# Patient Record
Sex: MALE | Race: WHITE | NOT HISPANIC OR LATINO | Employment: OTHER | URBAN - METROPOLITAN AREA
[De-identification: names, ages, dates, MRNs, and addresses within clinical notes are randomized per-mention and may not be internally consistent; named-entity substitution may affect disease eponyms.]

---

## 2017-07-20 PROBLEM — C09.9: Status: ACTIVE | Noted: 2017-07-20

## 2017-08-23 PROBLEM — Z95.828 PORTACATH IN PLACE: Status: ACTIVE | Noted: 2017-08-23

## 2018-01-11 PROBLEM — Z12.11 SCREENING FOR COLON CANCER: Status: ACTIVE | Noted: 2018-01-11

## 2018-01-11 PROBLEM — B18.2 CHRONIC HEPATITIS C WITHOUT HEPATIC COMA: Status: ACTIVE | Noted: 2018-01-11

## 2018-03-20 PROBLEM — K74.60 HEPATIC CIRRHOSIS DUE TO CHRONIC HEPATITIS C INFECTION: Status: ACTIVE | Noted: 2018-01-11

## 2018-10-15 PROBLEM — R10.11 RIGHT UPPER QUADRANT ABDOMINAL PAIN: Status: ACTIVE | Noted: 2018-10-15

## 2018-10-15 PROBLEM — D75.1 POLYCYTHEMIA: Status: ACTIVE | Noted: 2018-10-15

## 2018-10-18 PROBLEM — R74.8 LOW SERUM ALKALINE PHOSPHATASE: Status: ACTIVE | Noted: 2018-10-18

## 2019-09-25 PROBLEM — M27.2: Status: ACTIVE | Noted: 2019-09-25

## 2020-11-18 ENCOUNTER — OFFICE VISIT (OUTPATIENT)
Dept: ORTHOPEDICS | Facility: CLINIC | Age: 60
End: 2020-11-18
Payer: MEDICAID

## 2020-11-18 ENCOUNTER — HOSPITAL ENCOUNTER (OUTPATIENT)
Dept: RADIOLOGY | Facility: HOSPITAL | Age: 60
Discharge: HOME | End: 2020-11-18
Attending: PHYSICIAN ASSISTANT
Payer: MEDICAID

## 2020-11-18 ENCOUNTER — OFFICE VISIT (OUTPATIENT)
Dept: NEUROLOGY | Facility: CLINIC | Age: 60
End: 2020-11-18
Payer: MEDICAID

## 2020-11-18 VITALS
DIASTOLIC BLOOD PRESSURE: 86 MMHG | WEIGHT: 159.38 LBS | SYSTOLIC BLOOD PRESSURE: 136 MMHG | RESPIRATION RATE: 16 BRPM | HEIGHT: 67 IN | TEMPERATURE: 98 F | HEART RATE: 82 BPM | BODY MASS INDEX: 25.01 KG/M2

## 2020-11-18 VITALS
HEIGHT: 67 IN | DIASTOLIC BLOOD PRESSURE: 86 MMHG | TEMPERATURE: 97 F | WEIGHT: 157.06 LBS | HEART RATE: 82 BPM | BODY MASS INDEX: 24.65 KG/M2 | SYSTOLIC BLOOD PRESSURE: 136 MMHG | RESPIRATION RATE: 18 BRPM

## 2020-11-18 DIAGNOSIS — G89.29 CHRONIC MIDLINE LOW BACK PAIN WITH SCIATICA, SCIATICA LATERALITY UNSPECIFIED: ICD-10-CM

## 2020-11-18 DIAGNOSIS — M25.511 CHRONIC RIGHT SHOULDER PAIN: ICD-10-CM

## 2020-11-18 DIAGNOSIS — G89.29 CHRONIC NECK PAIN: Primary | ICD-10-CM

## 2020-11-18 DIAGNOSIS — M54.9 DORSALGIA, UNSPECIFIED: ICD-10-CM

## 2020-11-18 DIAGNOSIS — M54.40 CHRONIC MIDLINE LOW BACK PAIN WITH SCIATICA, SCIATICA LATERALITY UNSPECIFIED: ICD-10-CM

## 2020-11-18 DIAGNOSIS — G89.29 CHRONIC RIGHT SHOULDER PAIN: Primary | ICD-10-CM

## 2020-11-18 DIAGNOSIS — M54.2 CHRONIC NECK PAIN: Primary | ICD-10-CM

## 2020-11-18 DIAGNOSIS — M25.511 CHRONIC RIGHT SHOULDER PAIN: Primary | ICD-10-CM

## 2020-11-18 DIAGNOSIS — G89.29 CHRONIC RIGHT SHOULDER PAIN: ICD-10-CM

## 2020-11-18 DIAGNOSIS — G89.29 CHRONIC NECK PAIN: ICD-10-CM

## 2020-11-18 DIAGNOSIS — M54.2 CHRONIC NECK PAIN: ICD-10-CM

## 2020-11-18 PROCEDURE — 72100 X-RAY EXAM L-S SPINE 2/3 VWS: CPT | Mod: 26,,, | Performed by: RADIOLOGY

## 2020-11-18 PROCEDURE — 99203 PR OFFICE/OUTPT VISIT, NEW, LEVL III, 30-44 MIN: ICD-10-PCS | Mod: S$PBB,25,, | Performed by: PHYSICIAN ASSISTANT

## 2020-11-18 PROCEDURE — 97110 THERAPEUTIC EXERCISES: CPT | Mod: S$PBB,GP,, | Performed by: PHYSICIAN ASSISTANT

## 2020-11-18 PROCEDURE — 99999 PR PBB SHADOW E&M-EST. PATIENT-LVL V: CPT | Mod: PBBFAC,,, | Performed by: PHYSICIAN ASSISTANT

## 2020-11-18 PROCEDURE — 72100 X-RAY EXAM L-S SPINE 2/3 VWS: CPT | Mod: TC

## 2020-11-18 PROCEDURE — 73030 X-RAY EXAM OF SHOULDER: CPT | Mod: 26,RT,, | Performed by: RADIOLOGY

## 2020-11-18 PROCEDURE — 72100 XR LUMBAR SPINE 2 OR 3 VIEWS: ICD-10-PCS | Mod: 26,,, | Performed by: RADIOLOGY

## 2020-11-18 PROCEDURE — 73030 X-RAY EXAM OF SHOULDER: CPT | Mod: TC,RT

## 2020-11-18 PROCEDURE — 73030 XR SHOULDER COMPLETE 2 OR MORE VIEWS RIGHT: ICD-10-PCS | Mod: 26,RT,, | Performed by: RADIOLOGY

## 2020-11-18 PROCEDURE — 20610 DRAIN/INJ JOINT/BURSA W/O US: CPT | Mod: S$PBB,RT,, | Performed by: PHYSICIAN ASSISTANT

## 2020-11-18 PROCEDURE — 20610 DRAIN/INJ JOINT/BURSA W/O US: CPT | Mod: PBBFAC | Performed by: PHYSICIAN ASSISTANT

## 2020-11-18 PROCEDURE — 99215 OFFICE O/P EST HI 40 MIN: CPT | Mod: PBBFAC | Performed by: PHYSICIAN ASSISTANT

## 2020-11-18 PROCEDURE — 99215 OFFICE O/P EST HI 40 MIN: CPT | Mod: PBBFAC,25,27 | Performed by: PHYSICIAN ASSISTANT

## 2020-11-18 PROCEDURE — 99999 PR PBB SHADOW E&M-EST. PATIENT-LVL V: ICD-10-PCS | Mod: PBBFAC,,, | Performed by: PHYSICIAN ASSISTANT

## 2020-11-18 PROCEDURE — 99203 OFFICE O/P NEW LOW 30 MIN: CPT | Mod: S$PBB,25,, | Performed by: PHYSICIAN ASSISTANT

## 2020-11-18 PROCEDURE — 72040 X-RAY EXAM NECK SPINE 2-3 VW: CPT | Mod: 26,,, | Performed by: RADIOLOGY

## 2020-11-18 PROCEDURE — 72040 X-RAY EXAM NECK SPINE 2-3 VW: CPT | Mod: TC

## 2020-11-18 PROCEDURE — 72040 XR CERVICAL SPINE 2 OR 3 VIEWS: ICD-10-PCS | Mod: 26,,, | Performed by: RADIOLOGY

## 2020-11-18 PROCEDURE — 97110 PR THERAPEUTIC EXERCISES: ICD-10-PCS | Mod: S$PBB,GP,, | Performed by: PHYSICIAN ASSISTANT

## 2020-11-18 PROCEDURE — 20610 PR DRAIN/INJECT LARGE JOINT/BURSA: ICD-10-PCS | Mod: S$PBB,RT,, | Performed by: PHYSICIAN ASSISTANT

## 2020-11-18 PROCEDURE — 99204 PR OFFICE/OUTPT VISIT, NEW, LEVL IV, 45-59 MIN: ICD-10-PCS | Mod: S$PBB,,, | Performed by: PHYSICIAN ASSISTANT

## 2020-11-18 PROCEDURE — 99204 OFFICE O/P NEW MOD 45 MIN: CPT | Mod: S$PBB,,, | Performed by: PHYSICIAN ASSISTANT

## 2020-11-18 RX ORDER — TRIAMCINOLONE ACETONIDE 40 MG/ML
40 INJECTION, SUSPENSION INTRA-ARTICULAR; INTRAMUSCULAR ONCE
Status: COMPLETED | OUTPATIENT
Start: 2020-11-18 | End: 2020-11-18

## 2020-11-18 RX ORDER — BUPIVACAINE HYDROCHLORIDE 2.5 MG/ML
3 INJECTION, SOLUTION INFILTRATION; PERINEURAL ONCE
Status: COMPLETED | OUTPATIENT
Start: 2020-11-18 | End: 2020-11-18

## 2020-11-18 RX ORDER — LIDOCAINE 50 MG/G
2 PATCH TOPICAL DAILY
Qty: 90 PATCH | Refills: 3 | Status: SHIPPED | OUTPATIENT
Start: 2020-11-18 | End: 2023-08-11

## 2020-11-18 RX ORDER — DICLOFENAC SODIUM 10 MG/G
2 GEL TOPICAL 4 TIMES DAILY
Qty: 1 TUBE | Refills: 1 | Status: SHIPPED | OUTPATIENT
Start: 2020-11-18 | End: 2020-12-31 | Stop reason: SDUPTHER

## 2020-11-18 RX ADMIN — TRIAMCINOLONE ACETONIDE 40 MG: 40 INJECTION, SUSPENSION INTRA-ARTICULAR; INTRAMUSCULAR at 11:11

## 2020-11-18 RX ADMIN — BUPIVACAINE HYDROCHLORIDE 7.5 MG: 2.5 INJECTION, SOLUTION INFILTRATION; PERINEURAL at 11:11

## 2020-11-18 NOTE — PROGRESS NOTES
Subjective:      Patient ID: Junior Torito Serna Jr. is a 59 y.o. male.    Chief Complaint: Pain of the Right Shoulder    Review of patient's allergies indicates:  No Known Allergies   58 yo M presents to clinic with c/o right shoulder pain x months.  Denies any injury or trauma.  Pain is sharp and located to posterior shoulder.  Worse with reaching overhead or behind back.  He has also noticed decreased ROM and weakness.  Taking tylenol occasionally with some improvement of pain.  No swelling.  No numbness or tingling in shoulder/arm.      Review of Systems   Constitution: Negative for chills, diaphoresis and fever.   HENT: Negative for congestion, ear discharge and ear pain.    Eyes: Negative for blurred vision, discharge, double vision and pain.   Cardiovascular: Negative for chest pain, claudication and cyanosis.   Respiratory: Negative for cough, hemoptysis and shortness of breath.    Endocrine: Negative for cold intolerance and heat intolerance.   Skin: Negative for color change, dry skin, itching and rash.   Musculoskeletal: Positive for joint pain. Negative for arthritis, back pain, falls, gout, joint swelling, muscle weakness and neck pain.   Gastrointestinal: Negative for abdominal pain and change in bowel habit.   Neurological: Negative for brief paralysis, disturbances in coordination and dizziness.   Psychiatric/Behavioral: Negative for altered mental status and depression.         Objective:          General    Constitutional: He is oriented to person, place, and time. He appears well-developed and well-nourished. No distress.   HENT:   Head: Atraumatic.   Eyes: EOM are normal. Right eye exhibits no discharge. Left eye exhibits no discharge.   Cardiovascular: Normal rate.    Pulmonary/Chest: Effort normal. No respiratory distress.   Abdominal: Soft.   Neurological: He is alert and oriented to person, place, and time.   Psychiatric: He has a normal mood and affect. His behavior is normal.         Right  Shoulder Exam     Inspection/Observation   Swelling: absent  Bruising: absent  Scars: absent  Deformity: absent    Range of Motion   Active abduction: 110   Passive abduction: 120   Forward Flexion: 90   External Rotation 0 degrees: 40   Internal rotation 0 degrees: Sacrum     Tests & Signs   Huntley test: positive  Impingement: positive  Lift Off Sign: positive  Belly Press: positive  Speed's Test: positive    Other   Sensation: normal    Comments:  Tenderness to posterior shoulder    Left Shoulder Exam     Inspection/Observation   Swelling: absent  Bruising: absent  Scars: absent  Deformity: absent    Range of Motion   Active abduction: 160   Passive abduction: 160   Forward Flexion: 160   External Rotation 0 degrees: 90   Internal rotation 0 degrees: Mid thoracic     Tests & Signs   Huntley test: negative  Impingement: negative  Lift Off Sign: negative  Belly Press: negative  Speed's Test: negative    Other   Sensation: normal       Muscle Strength   Right Upper Extremity   Shoulder Abduction: 4/5   Shoulder Internal Rotation: 4/5   Shoulder External Rotation: 4/5   Supraspinatus: 4/5   Subscapularis: 4/5   Biceps: 5/5   Left Upper Extremity  Shoulder Abduction: 5/5   Shoulder Internal Rotation: 5/5   Shoulder External Rotation: 5/5   Supraspinatus: 5/5   Subscapularis: 5/5   Biceps: 5/5     Vascular Exam     Right Pulses      Radial:                    2+      Left Pulses      Radial:                    2+      Capillary Refill  Right Hand: normal capillary refill  Left Hand: normal capillary refill              Assessment:         Xray Right Shoulder 11/18/20:  There is no evidence of fracture, dislocation or other acute osseous abnormality. No focal soft tissue abnormality.      Encounter Diagnosis   Name Primary?    Chronic right shoulder pain Yes    Chronic right shoulder pain  -     Ambulatory referral/consult to Orthopedics  -     triamcinolone acetonide injection 40 mg  -     bupivacaine 0.25% (2.5  mg/ml) injection 7.5 mg  -     diclofenac sodium (VOLTAREN) 1 % Gel; Apply 2 g topically 4 (four) times daily. As needed  Dispense: 1 Tube; Refill: 1               Plan:         We have discussed a variety of treatment options including medications, injections, physical therapy and other alternative treatments. I also explained the indications, risks and benefits of surgery. Patient chooses to proceed with CSI and physical therapy at this time.    I made the decision to obtain old records of the patient including previous notes and imaging. New imaging was ordered today of the extremity or extremities evaluated. I independently reviewed and interpreted the radiographs and/or MRIs today as well as prior imaging.      1. Injection Procedure  A time out was performed, including verification of patient ID, procedure, site and side, availability of information and equipment, review of safety issues, and agreement with consent, the procedure site was marked.    After time out was performed, the patient was prepped aseptically with chloraprep swabstick. A diagnostic and therapeutic injection of 1:3cc Kenalog/Marcaine was given under sterile technique using a 22g x 1.5 needle from the Posterior  aspect of the right Subacromial in the sitting position.      Bowen Torito Serna Jr. had no adverse reactions to the medication. Pain decreased. He was instructed to apply ice to the joint for 20 minutes and avoid strenuous activities for 24-36 hours following the injection. He was warned of possible blood sugar and/or blood pressure changes during that time. Following that time, he can resume regular activities.    He was reminded to call the clinic immediately for any adverse side effects as explained in clinic today.    2. Ice compress to the affected area 2-3x a day for 15-20 minutes as needed for pain management.  3. Voltaren gel topically as prescribed as needed. Patient is aware that he should not take any other NSAIDs  while using this.  4. Ambulatory referral to physical therapy for periscapular/rotator cuff strengthening.   5. HEP 10451 - I instructed and demonstrated a periscapular/rotator cuff strengthening HEP. The patient then demonstrated understanding of exercises and proper technique. This program was performed for 10 minutes.   6. RTC in 6 weeks for follow-up, sooner if needed. Consider MRI if not improved.    Patient voices understanding of and agreement with treatment plan. All of the patient's questions were answered and the patient will contact us if he has any questions or concerns in the interim.

## 2020-11-18 NOTE — PROGRESS NOTES
Subjective:       Patient ID: Junior Torito Serna Jr. is a 59 y.o. male.    Chief Complaint: Neurologic Problem (Numbness to the right arm and left leg, low back pain and neck pain)    HPI  Junior Torito Serna Jr. is a 59 y.o. male is here for initial visit. He is referred for chronic neck and low back pain with radiation into both arms and both legs. This pain has been present for at least 3 years. He underwent PT about 2 years ago as well as epidural steroid injections in neck and back. Neither PT nor injections were helpful. For the past 2 years, he has been taking only Tylenol 800mg daily for pain. He has just been treated for cirrhosis, and he is cautious with medications.     New symptoms of numbness (pins and needles) in the left leg for the past 3 weeks. He is having difficulty walking. He feels a soreness constantly in the left leg. This is worrisome for him. He has not fallen. He has no weakness. Pain is more in low back than legs. LBP is constant. No increased pain with prolonged walking and standing. Bending is what aggravates his back the most.    He has constant pain in the right shoulder. He has shooting pains in the neck with certain motions and especially in the mornings. Improves as he is up and around. He has no weakness in arms and hands currently. Pain in neck>>arms. He has ED since he has had neck pain.    Prior treatment in [place], [facility] in [place], [place]. He had MRIs and injections.    Past Medical History:   Diagnosis Date    Cirrhosis     Glaucoma     Hepatitis C     Hepatitis C     History of chemotherapy 2014    Tonsil Cancer    History of radiation to head and neck region 2014    Tonsil Cancer    Hypertension     Right tonsillar squamous cell carcinoma 2014       Past Surgical History:   Procedure Laterality Date    COLONOSCOPY N/A 4/6/2018    rpt 2023    GASTROSTOMY TUBE PLACEMENT      GASTROSTOMY TUBE REVISION      LAPAROSCOPIC  CHOLECYSTECTOMY N/A 10/16/2018    Procedure: CHOLECYSTECTOMY, LAPAROSCOPIC;  Surgeon: Terence Dash MD;  Location: [facility];  Service: General;  Laterality: N/A;    MEDIPORT REMOVAL N/A 09/01/2017    PORTACATH PLACEMENT      removal    UPPER GASTROINTESTINAL ENDOSCOPY         Family History   Problem Relation Age of Onset    Hypertension Mother     Dementia Mother     Parkinsonism Father        Social History     Socioeconomic History    Marital status:      Spouse name: Not on file    Number of children: Not on file    Years of education: Not on file    Highest education level: Not on file   Occupational History    Not on file   Social Needs    Financial resource strain: Not on file    Food insecurity     Worry: Not on file     Inability: Not on file    Transportation needs     Medical: Not on file     Non-medical: Not on file   Tobacco Use    Smoking status: Never Smoker    Smokeless tobacco: Former User   Substance and Sexual Activity    Alcohol use: No    Drug use: Yes     Types: Marijuana    Sexual activity: Yes     Partners: Female   Lifestyle    Physical activity     Days per week: Not on file     Minutes per session: Not on file    Stress: Not on file   Relationships    Social connections     Talks on phone: Not on file     Gets together: Not on file     Attends [employer] service: Not on file     Active member of club or organization: Not on file     Attends meetings of clubs or organizations: Not on file     Relationship status: Not on file   Other Topics Concern    Not on file   Social History Narrative    Not on file       Current Outpatient Medications   Medication Sig Dispense Refill    acetaminophen (TYLENOL EXTRA STRENGTH) 500 MG tablet Take 500 mg by mouth every 6 (six) hours as needed for Pain.      quinapril (ACCUPRIL) 10 MG tablet Take 1 tablet (10 mg total) by mouth every evening. 30 tablet 0    sildenafiL (VIAGRA) 100 MG tablet Take 1 tablet (100 mg total) by  mouth daily as needed for Erectile Dysfunction. 15 tablet 3    lidocaine (LIDODERM) 5 % Place 2 patches onto the skin once daily. One patch to neck and one to low back daily. 90 patch 3    penicillin v potassium (VEETID) 500 MG tablet TAKE 1 TABLET BY MOUTH EVERY EVERY 6 HOURS UNTIL FINISHED  1    pentoxifylline (TRENTAL) 400 mg TbSR TAKE 1 TABLET BY MOUTH 3 X DAILY WITH FOOD UNTIL COMPLETLY HEALED  11     No current facility-administered medications for this visit.        Review of patient's allergies indicates:  No Known Allergies      Review of Systems   Constitutional: Negative for appetite change and fever.   HENT: Negative for sore throat.    Eyes: Negative for visual disturbance.   Respiratory: Negative for cough and shortness of breath.    Cardiovascular: Negative for chest pain.   Gastrointestinal: Negative for nausea and vomiting.   Endocrine: Negative for cold intolerance and heat intolerance.   Genitourinary: Negative for difficulty urinating.        ED     Musculoskeletal: Positive for arthralgias (right shoulder), back pain (radiating into left greater than right legs), neck pain and neck stiffness.   Skin: Negative for rash.   Allergic/Immunologic: Negative for food allergies.   Neurological: Positive for numbness (right leg). Negative for dizziness, tremors, seizures, speech difficulty, weakness and headaches.   Hematological: Does not bruise/bleed easily.   Psychiatric/Behavioral: Negative for agitation, decreased concentration and sleep disturbance.       Objective:      Neurologic Exam     Mental Status   Oriented to person, place, and time.     Cranial Nerves     CN III, IV, VI   Pupils are equal, round, and reactive to light.    Gait, Coordination, and Reflexes     Gait  Gait: normal    Reflexes   Right brachioradialis: 2+  Left brachioradialis: 2+  Right biceps: 2+  Left biceps: 2+  Right triceps: 2+  Left triceps: 2+  Right patellar: 2+  Left patellar: 2+  Right achilles: 2+  Left achilles:  2+    Physical Exam  Constitutional:       General: He is not in acute distress.     Appearance: Normal appearance. He is well-developed. He is not diaphoretic.   HENT:      Head: Normocephalic and atraumatic.      Right Ear: External ear normal.      Left Ear: External ear normal.      Nose: Nose normal.   Eyes:      General: No scleral icterus.        Right eye: No discharge.         Left eye: No discharge.      Extraocular Movements: Extraocular movements intact.      Conjunctiva/sclera: Conjunctivae normal.      Pupils: Pupils are equal, round, and reactive to light.   Neck:      Musculoskeletal: Normal range of motion and neck supple.   Cardiovascular:      Rate and Rhythm: Normal rate and regular rhythm.      Heart sounds: Normal heart sounds.   Pulmonary:      Effort: Pulmonary effort is normal.      Breath sounds: Normal breath sounds.   Abdominal:      General: Bowel sounds are normal.      Palpations: Abdomen is soft.   Musculoskeletal:      Right shoulder: He exhibits decreased range of motion and tenderness. He exhibits no swelling and no crepitus.      Cervical back: He exhibits decreased range of motion, tenderness, bony tenderness and pain. He exhibits no spasm.      Lumbar back: He exhibits decreased range of motion, tenderness, bony tenderness and pain. He exhibits no deformity and no spasm.   Skin:     General: Skin is warm and dry.   Neurological:      Mental Status: He is alert and oriented to person, place, and time.      Cranial Nerves: No cranial nerve deficit.      Sensory: Sensory deficit (decreases sensation to sharp left anterolateral thigh) present.      Motor: Motor function is intact. No abnormal muscle tone.      Coordination: Coordination is intact. Coordination normal.      Gait: Gait is intact.      Deep Tendon Reflexes: Reflexes are normal and symmetric.      Reflex Scores:       Tricep reflexes are 2+ on the right side and 2+ on the left side.       Bicep reflexes are 2+ on the  right side and 2+ on the left side.       Brachioradialis reflexes are 2+ on the right side and 2+ on the left side.       Patellar reflexes are 2+ on the right side and 2+ on the left side.       Achilles reflexes are 2+ on the right side and 2+ on the left side.     Comments: 5/5 biceps  5/5 triceps  5/5 deltoid/abd  No interosseous atrophy    5/5 hamstrings  5/5 quads  5/5 hip flexors  5/5 ankle dorsi and EHLs   Psychiatric:         Mood and Affect: Mood normal.         Behavior: Behavior normal.         Thought Content: Thought content normal.         Assessment:       1. Chronic neck pain    2. Chronic midline low back pain with sciatica, sciatica laterality unspecified    3. Chronic right shoulder pain    4. Dorsalgia, unspecified        Plan:   Chronic neck pain  -     Ambulatory referral/consult to Physical/Occupational Therapy; Future; Expected date: 11/25/2020  -     lidocaine (LIDODERM) 5 %; Place 2 patches onto the skin once daily. One patch to neck and one to low back daily.  Dispense: 90 patch; Refill: 3  -     X-Ray Cervical Spine 2 or 3 Views; Future; Expected date: 11/18/2020    Chronic midline low back pain with sciatica, sciatica laterality unspecified  -     Ambulatory referral/consult to Physical/Occupational Therapy; Future; Expected date: 11/25/2020  -     lidocaine (LIDODERM) 5 %; Place 2 patches onto the skin once daily. One patch to neck and one to low back daily.  Dispense: 90 patch; Refill: 3  -     X-Ray Lumbar Spine 2 Or 3 Views; Future; Expected date: 11/18/2020    Chronic right shoulder pain  -     Ambulatory referral/consult to Orthopedics; Future; Expected date: 11/25/2020  -     X-ray Shoulder 2 or More Views Right; Future; Expected date: 11/18/2020    Dorsalgia, unspecified  -     X-Ray Lumbar Spine 2 Or 3 Views; Future; Expected date: 11/18/2020    Instructed patient to start his own exercises and stretches daily. Demonstrated some. Advised him to find a program on you tube.  There are plenty of good neck and back exercise videos there. Avoid anything too painful.    Send for MRI reports from [facility] in [place] for review.    Discussed risks and benefits of potential treatment options at length as well as potential side effects of medication changes. Medications were changed. Please refer to medication reconciliation report for details. Medication compliance discussed. Instructed to call clinic if concerned or to ED if emergency.    EDUAR Warner

## 2020-11-18 NOTE — LETTER
November 18, 2020      Lisa Wilson, TROY-C  [address] [address]           [place] [facility]  [address] [address]  Phone: [phone]  Fax: [phone]          Patient: Junior Torito Serna Jr.   MR Number: [id number]   YOB: 1960   Date of Visit: 11/18/2020       Dear Lisa Wilson:    Thank you for referring Junior Serna to me for evaluation. Attached you will find relevant portions of my assessment and plan of care.    If you have questions, please do not hesitate to call me. I look forward to following Junior Serna along with you.    Sincerely,    EDUAR Robbins    Enclosure  CC:  No Recipients    If you would like to receive this communication electronically, please contact [email or web address] or [phone] to request more information on [employer] access.    For providers and/or their staff who would like to refer a patient to [facility], please contact us through our one-stop-shop provider referral line, [facility] access hospitalierge, at [phone].    If you feel you have received this communication in error or would no longer like to receive these types of communications, please e-mail [email or web address]

## 2020-11-18 NOTE — LETTER
Dr. Storey at bedside reviewing procedure with pt and pt's wife. Discharge instructions given and explained to pt and pt's wife. Both verbalize understanding of instructions.   November 18, 2020      EDUAR Robbins  [address] [facility] [facility] [address] [facility] [address]           [place] [facility]  [address] [facility] [facility] [address]  Phone: [phone]          Patient: Junior Torito Serna Jr.   MR Number: [id number]   YOB: 1960   Date of Visit: 11/18/2020       Dear Kita Mcpherson:    Thank you for referring Junior Serna to me for evaluation. Attached you will find relevant portions of my assessment and plan of care.    If you have questions, please do not hesitate to call me. I look forward to following Junior Serna along with you.    Sincerely,    Pretty Hendrix PA-C    Enclosure  CC:  No Recipients    If you would like to receive this communication electronically, please contact [email or web address] or [phone] to request more information on [employer] access.    For providers and/or their staff who would like to refer a patient to [facility], please contact us through our one-stop-shop provider referral line, [facility] , at [phone].    If you feel you have received this communication in error or would no longer like to receive these types of communications, please e-mail [email or web address]

## 2020-12-24 NOTE — PROGRESS NOTES
Subjective:      Patient ID: Junior Torito Serna Jr. is a 60 y.o. male.    Chief Complaint: Shoulder Pain (6 week follow up for right shoulder pain)    Review of patient's allergies indicates:  No Known Allergies   Interval:  Patient returns to clinic for follow up of right shoulder pain.  States that CSI at last visit provided good relief for about a week before symptoms returned.  He has been completing periscapular/rotator cuff strengthening home exercise program.  States that he has noticed slight improvement in ROM but is still having pain and difficulties with daily activities.  Voltaren gel also provides some relief.    HPI 11/18/20:  60 yo RHD M presents to clinic with c/o right shoulder pain x months.  Denies any injury or trauma.  Pain is sharp and located to posterior shoulder.  Worse with reaching overhead or behind back.  He has also noticed decreased ROM and weakness.  Taking tylenol occasionally with some improvement of pain.  No swelling.  No numbness or tingling in shoulder/arm.      Review of Systems   Constitution: Negative for chills, diaphoresis and fever.   HENT: Negative for congestion, ear discharge and ear pain.    Eyes: Negative for blurred vision, discharge, double vision and pain.   Cardiovascular: Negative for chest pain, claudication and cyanosis.   Respiratory: Negative for cough, hemoptysis and shortness of breath.    Endocrine: Negative for cold intolerance and heat intolerance.   Skin: Negative for color change, dry skin, itching and rash.   Musculoskeletal: Positive for joint pain. Negative for arthritis, back pain, falls, gout, joint swelling, muscle weakness and neck pain.   Gastrointestinal: Negative for abdominal pain and change in bowel habit.   Neurological: Negative for brief paralysis, disturbances in coordination and dizziness.   Psychiatric/Behavioral: Negative for altered mental status and depression.         Objective:          General    Constitutional: He is oriented  to person, place, and time. He appears well-developed and well-nourished. No distress.   HENT:   Head: Atraumatic.   Eyes: EOM are normal. Right eye exhibits no discharge. Left eye exhibits no discharge.   Cardiovascular: Normal rate.    Pulmonary/Chest: Effort normal. No respiratory distress.   Abdominal: Soft.   Neurological: He is alert and oriented to person, place, and time.   Psychiatric: He has a normal mood and affect. His behavior is normal.         Right Shoulder Exam     Inspection/Observation   Swelling: absent  Bruising: absent  Scars: absent  Deformity: absent    Range of Motion   Active abduction: 110   Passive abduction: 120   Forward Flexion: 90   External Rotation 0 degrees: 40   Internal rotation 0 degrees: Sacrum     Tests & Signs   Huntley test: positive  Impingement: positive  Lift Off Sign: positive  Belly Press: positive  Speed's Test: positive    Other   Sensation: normal    Comments:  Tenderness to posterior shoulder    Left Shoulder Exam     Inspection/Observation   Swelling: absent  Bruising: absent  Scars: absent  Deformity: absent    Range of Motion   Active abduction: 160   Passive abduction: 160   Forward Flexion: 160   External Rotation 0 degrees: 90   Internal rotation 0 degrees: Mid thoracic     Tests & Signs   Huntley test: negative  Impingement: negative  Lift Off Sign: negative  Belly Press: negative  Speed's Test: negative    Other   Sensation: normal       Muscle Strength   Right Upper Extremity   Shoulder Abduction: 4/5   Shoulder Internal Rotation: 4/5   Shoulder External Rotation: 4/5   Supraspinatus: 4/5   Subscapularis: 4/5   Biceps: 5/5   Left Upper Extremity  Shoulder Abduction: 5/5   Shoulder Internal Rotation: 5/5   Shoulder External Rotation: 5/5   Supraspinatus: 5/5   Subscapularis: 5/5   Biceps: 5/5     Vascular Exam     Right Pulses      Radial:                    2+      Left Pulses      Radial:                    2+      Capillary Refill  Right Hand: normal  capillary refill  Left Hand: normal capillary refill              Assessment:         Xray Right Shoulder 11/18/20:  There is no evidence of fracture, dislocation or other acute osseous abnormality. No focal soft tissue abnormality.      Encounter Diagnoses   Name Primary?    Shoulder pain, unspecified chronicity, unspecified laterality     Chronic right shoulder pain Yes    Chronic right shoulder pain  -     diclofenac sodium (VOLTAREN) 1 % Gel; Apply 2 g topically 4 (four) times daily. As needed  Dispense: 1 Tube; Refill: 1    Shoulder pain, unspecified chronicity, unspecified laterality  -     MRI Shoulder Without Contrast Right; Future; Expected date: 12/31/2020               Plan:         I made the decision to obtain old records of the patient including previous notes and imaging. New imaging was ordered today of the extremity or extremities evaluated. I independently reviewed and interpreted the radiographs and/or MRIs today as well as prior imaging.    1. MRI right shoulder to assess for rotator cuff pathology.   2. Discontinue activity/exercise until results of MRI.  3. Ice compress to the affected area 2-3x a day for 15-20 minutes as needed for pain management.  4. Continue voltaren gel topically as prescribed as needed. Refilled today.  5. RTC in 1 week for follow-up and MRI results, sooner if needed.    Patient voices understanding of and agreement with treatment plan. All of the patient's questions were answered and the patient will contact us if  he has any questions or concerns in the interim.

## 2020-12-30 ENCOUNTER — OFFICE VISIT (OUTPATIENT)
Dept: NEUROLOGY | Facility: CLINIC | Age: 60
End: 2020-12-30
Payer: MEDICAID

## 2020-12-30 VITALS
BODY MASS INDEX: 24.47 KG/M2 | OXYGEN SATURATION: 98 % | HEART RATE: 75 BPM | HEIGHT: 67 IN | WEIGHT: 155.88 LBS | RESPIRATION RATE: 14 BRPM | DIASTOLIC BLOOD PRESSURE: 70 MMHG | TEMPERATURE: 98 F | SYSTOLIC BLOOD PRESSURE: 114 MMHG

## 2020-12-30 DIAGNOSIS — M54.2 CHRONIC NECK PAIN: ICD-10-CM

## 2020-12-30 DIAGNOSIS — G89.29 CHRONIC BILATERAL LOW BACK PAIN WITH SCIATICA, SCIATICA LATERALITY UNSPECIFIED: ICD-10-CM

## 2020-12-30 DIAGNOSIS — G89.29 CHRONIC NECK PAIN: ICD-10-CM

## 2020-12-30 DIAGNOSIS — M25.511 CHRONIC RIGHT SHOULDER PAIN: ICD-10-CM

## 2020-12-30 DIAGNOSIS — M54.40 CHRONIC BILATERAL LOW BACK PAIN WITH SCIATICA, SCIATICA LATERALITY UNSPECIFIED: ICD-10-CM

## 2020-12-30 DIAGNOSIS — G89.29 CHRONIC RIGHT SHOULDER PAIN: ICD-10-CM

## 2020-12-30 PROBLEM — M25.519 CHRONIC SHOULDER PAIN: Status: ACTIVE | Noted: 2020-12-30

## 2020-12-30 PROCEDURE — 99999 PR PBB SHADOW E&M-EST. PATIENT-LVL IV: CPT | Mod: PBBFAC,,, | Performed by: PHYSICIAN ASSISTANT

## 2020-12-30 PROCEDURE — 99214 OFFICE O/P EST MOD 30 MIN: CPT | Mod: PBBFAC | Performed by: PHYSICIAN ASSISTANT

## 2020-12-30 PROCEDURE — 99999 PR PBB SHADOW E&M-EST. PATIENT-LVL IV: ICD-10-PCS | Mod: PBBFAC,,, | Performed by: PHYSICIAN ASSISTANT

## 2020-12-30 PROCEDURE — 99214 PR OFFICE/OUTPT VISIT, EST, LEVL IV, 30-39 MIN: ICD-10-PCS | Mod: S$PBB,,, | Performed by: PHYSICIAN ASSISTANT

## 2020-12-30 PROCEDURE — 99214 OFFICE O/P EST MOD 30 MIN: CPT | Mod: S$PBB,,, | Performed by: PHYSICIAN ASSISTANT

## 2020-12-30 NOTE — PROGRESS NOTES
Subjective:       Patient ID: Junior Torito Serna Jr. is a 60 y.o. male.    Chief Complaint: Follow-up    HPI  Junior Torito Serna Jr. is a 60 y.o. male is here for initial visit. He is referred for chronic neck and low back pain with radiation into both arms and both legs. This pain has been present for at least 3 years. He underwent PT about 2 years ago as well as epidural steroid injections in neck and back. Neither PT nor injections were helpful. For the past 2 years, he has been taking only Tylenol 800mg daily for pain. He has just been treated for cirrhosis, and he is cautious with medications.     New symptoms of numbness (pins and needles) in the left leg have improved with exercises. Gait is improved as well. He has not fallen. He has no weakness. Pain is more in low back than legs. LBP is improved with home exercise program. He is only reminded to do it when hurting, though. He has not done exercises daily. No increased pain with prolonged walking and standing. Bending is what aggravates his back the most.    He has constant pain in the right shoulder. He saw ortho and had steroid injection which helped. Lidoderm is very helpful, too.  He has shooting pains in the neck with certain motions and especially in the mornings. Improves as he is up and around. He has no weakness in arms and hands currently. Pain in neck>>arms.     Prior treatment in [place], [facility] in [place], [place]. He had MRIs and injections. Records reviewed. He has degenerative changes on C spine and Lspine MRs. No myelopathy or major stenosis. MRIs dated 2017. Medicaid did not approve repeat studies.    Past Medical History:   Diagnosis Date    Cirrhosis     Glaucoma     Hepatitis C     Hepatitis C     History of chemotherapy 2014    Tonsil Cancer    History of radiation to head and neck region 2014    Tonsil Cancer    Hypertension     Right tonsillar squamous cell carcinoma 2014       Past  Surgical History:   Procedure Laterality Date    COLONOSCOPY N/A 4/6/2018    rpt 2023    GASTROSTOMY TUBE PLACEMENT      GASTROSTOMY TUBE REVISION      LAPAROSCOPIC CHOLECYSTECTOMY N/A 10/16/2018    Procedure: CHOLECYSTECTOMY, LAPAROSCOPIC;  Surgeon: Terence Dash MD;  Location: [facility];  Service: General;  Laterality: N/A;    MEDIPORT REMOVAL N/A 09/01/2017    PORTACATH PLACEMENT      removal    UPPER GASTROINTESTINAL ENDOSCOPY         Family History   Problem Relation Age of Onset    Hypertension Mother     Dementia Mother     Parkinsonism Father        Social History     Socioeconomic History    Marital status:      Spouse name: Not on file    Number of children: Not on file    Years of education: Not on file    Highest education level: Not on file   Occupational History    Not on file   Social Needs    Financial resource strain: Not on file    Food insecurity     Worry: Not on file     Inability: Not on file    Transportation needs     Medical: Not on file     Non-medical: Not on file   Tobacco Use    Smoking status: Never Smoker    Smokeless tobacco: Former User   Substance and Sexual Activity    Alcohol use: No    Drug use: Yes     Types: Marijuana    Sexual activity: Yes     Partners: Female   Lifestyle    Physical activity     Days per week: Not on file     Minutes per session: Not on file    Stress: Not on file   Relationships    Social connections     Talks on phone: Not on file     Gets together: Not on file     Attends Protestant service: Not on file     Active member of club or organization: Not on file     Attends meetings of clubs or organizations: Not on file     Relationship status: Not on file   Other Topics Concern    Not on file   Social History Narrative    Not on file       Current Outpatient Medications   Medication Sig Dispense Refill    acetaminophen (TYLENOL EXTRA STRENGTH) 500 MG tablet Take 500 mg by mouth every 6 (six) hours as needed for Pain.       diclofenac sodium (VOLTAREN) 1 % Gel Apply 2 g topically 4 (four) times daily. As needed 1 Tube 1    lidocaine (LIDODERM) 5 % Place 2 patches onto the skin once daily. One patch to neck and one to low back daily. 90 patch 3    quinapril (ACCUPRIL) 10 MG tablet Take 1 tablet (10 mg total) by mouth every evening. 30 tablet 0    sildenafiL (VIAGRA) 100 MG tablet Take 1 tablet (100 mg total) by mouth daily as needed for Erectile Dysfunction. 15 tablet 0    penicillin v potassium (VEETID) 500 MG tablet TAKE 1 TABLET BY MOUTH EVERY EVERY 6 HOURS UNTIL FINISHED  1    pentoxifylline (TRENTAL) 400 mg TbSR TAKE 1 TABLET BY MOUTH 3 X DAILY WITH FOOD UNTIL COMPLETLY HEALED  11     No current facility-administered medications for this visit.        Review of patient's allergies indicates:  No Known Allergies      Review of Systems   Constitutional: Negative for appetite change and fever.   HENT: Negative for sore throat.    Eyes: Negative for visual disturbance.   Respiratory: Negative for cough and shortness of breath.    Cardiovascular: Negative for chest pain.   Gastrointestinal: Negative for nausea and vomiting.   Endocrine: Negative for cold intolerance and heat intolerance.   Genitourinary: Negative for difficulty urinating.        ED     Musculoskeletal: Positive for arthralgias (right shoulder), back pain (radiating into left greater than right legs), neck pain and neck stiffness.   Skin: Negative for rash.   Allergic/Immunologic: Negative for food allergies.   Neurological: Positive for numbness (right leg). Negative for dizziness, tremors, seizures, speech difficulty, weakness and headaches.   Hematological: Does not bruise/bleed easily.   Psychiatric/Behavioral: Negative for agitation, decreased concentration and sleep disturbance.       Objective:       [phone] 11/18/2020 Routine      Narrative & Impression     EXAMINATION:  XR LUMBAR SPINE 2 OR 3 VIEWS     CLINICAL HISTORY:  Back pain or radiculopathy, > 6  wks;chronic low back pain with radicular symptoms;.  Dorsalgia, unspecified     TECHNIQUE:  Three views of the lumbar spine were obtained dated November 18, 2020.     COMPARISON:  September 4, 2020.     FINDINGS:  Degenerative change of the lumbar spine with minimal disc space narrowing at L5-S1.  Questionable minimal anterolisthesis of L4 on L5.     There is no evidence of an acute fracture.     Impression:     As above.        Electronically signed by: Terence Valle MD  Date:                                            11/18/2020  Time:                                           10:21        Narrative & Impression     EXAMINATION:  XR CERVICAL SPINE 2 OR 3 VIEWS     CLINICAL HISTORY:  Cervicalgia     TECHNIQUE:  Three views of the cervical spine dated November 18, 2020.     COMPARISON:  September 4, 2020.     FINDINGS:  Multilevel cervical spondylosis with bony spurring and disc space narrowing greatest at C5 and C6.  Stable, minimal retrolisthesis of C3 on C4.     There is no plain film evidence of fracture or acute subluxation.  No prevertebral soft tissue swelling.     Impression:     As above.        Electronically signed by: Terence Valle MD  Date:                                            11/18/2020       Neurologic Exam     Mental Status   Oriented to person, place, and time.     Cranial Nerves     CN III, IV, VI   Pupils are equal, round, and reactive to light.    Gait, Coordination, and Reflexes     Gait  Gait: normal    Reflexes   Right brachioradialis: 2+  Left brachioradialis: 2+  Right biceps: 2+  Left biceps: 2+  Right triceps: 2+  Left triceps: 2+  Right patellar: 2+  Left patellar: 2+  Right achilles: 2+  Left achilles: 2+    Physical Exam  Constitutional:       General: He is not in acute distress.     Appearance: Normal appearance. He is well-developed. He is not diaphoretic.   HENT:      Head: Normocephalic and atraumatic.      Right Ear: External ear normal.      Left Ear: External ear normal.       Nose: Nose normal.   Eyes:      General: No scleral icterus.        Right eye: No discharge.         Left eye: No discharge.      Extraocular Movements: Extraocular movements intact.      Conjunctiva/sclera: Conjunctivae normal.      Pupils: Pupils are equal, round, and reactive to light.   Neck:      Musculoskeletal: Normal range of motion and neck supple.   Cardiovascular:      Rate and Rhythm: Normal rate and regular rhythm.      Heart sounds: Normal heart sounds.   Pulmonary:      Effort: Pulmonary effort is normal.      Breath sounds: Normal breath sounds.   Abdominal:      General: Bowel sounds are normal.      Palpations: Abdomen is soft.   Musculoskeletal:      Right shoulder: He exhibits decreased range of motion and tenderness. He exhibits no swelling and no crepitus.      Cervical back: He exhibits decreased range of motion, tenderness, bony tenderness and pain. He exhibits no spasm.      Lumbar back: He exhibits decreased range of motion, tenderness, bony tenderness and pain. He exhibits no deformity and no spasm.   Skin:     General: Skin is warm and dry.   Neurological:      Mental Status: He is alert and oriented to person, place, and time.      Cranial Nerves: No cranial nerve deficit.      Sensory: Sensory deficit (decreases sensation to sharp left anterolateral thigh) present.      Motor: Motor function is intact. No abnormal muscle tone.      Coordination: Coordination is intact. Coordination normal.      Gait: Gait is intact.      Deep Tendon Reflexes: Reflexes are normal and symmetric.      Reflex Scores:       Tricep reflexes are 2+ on the right side and 2+ on the left side.       Bicep reflexes are 2+ on the right side and 2+ on the left side.       Brachioradialis reflexes are 2+ on the right side and 2+ on the left side.       Patellar reflexes are 2+ on the right side and 2+ on the left side.       Achilles reflexes are 2+ on the right side and 2+ on the left side.     Comments: 5/5  biceps  5/5 triceps  5/5 deltoid/abd  No interosseous atrophy    5/5 hamstrings  5/5 quads  5/5 hip flexors  5/5 ankle dorsi and EHLs   Psychiatric:         Mood and Affect: Mood normal.         Behavior: Behavior normal.         Thought Content: Thought content normal.         Assessment:       1. Chronic neck pain    2. Chronic bilateral low back pain with sciatica, sciatica laterality unspecified    3. Chronic right shoulder pain        Plan:   Chronic neck pain  Continue regular stretches and exercises. Continue to work on improving posture.  Continue Lidoderm    Chronic bilateral low back pain with sciatica, sciatica laterality unspecified  Continue regular stretches and exercises. Continue to work on improving posture.  Continue Lidoderm    Chronic right shoulder pain  Follow up with ortho as scheduled.    Instructed patient to start his own exercises and stretches daily. Demonstrated some. Advised him to find a program on you tube. There are plenty of good neck and back exercise videos there. Avoid anything too painful.        Discussed risks and benefits of potential treatment options at length as well as potential side effects of medication changes. Medications were not changed. Please refer to medication reconciliation report for details. Medication compliance discussed. Instructed to call clinic if concerned or to ED if emergency.    EDUAR Warner

## 2020-12-31 ENCOUNTER — OFFICE VISIT (OUTPATIENT)
Dept: ORTHOPEDICS | Facility: CLINIC | Age: 60
End: 2020-12-31
Payer: MEDICAID

## 2020-12-31 VITALS
RESPIRATION RATE: 18 BRPM | TEMPERATURE: 98 F | HEIGHT: 68 IN | HEART RATE: 68 BPM | BODY MASS INDEX: 23.69 KG/M2 | WEIGHT: 156.31 LBS | DIASTOLIC BLOOD PRESSURE: 86 MMHG | SYSTOLIC BLOOD PRESSURE: 132 MMHG

## 2020-12-31 DIAGNOSIS — G89.29 CHRONIC RIGHT SHOULDER PAIN: Primary | ICD-10-CM

## 2020-12-31 DIAGNOSIS — M25.519 SHOULDER PAIN, UNSPECIFIED CHRONICITY, UNSPECIFIED LATERALITY: ICD-10-CM

## 2020-12-31 DIAGNOSIS — M25.511 CHRONIC RIGHT SHOULDER PAIN: Primary | ICD-10-CM

## 2020-12-31 PROCEDURE — 99213 PR OFFICE/OUTPT VISIT, EST, LEVL III, 20-29 MIN: ICD-10-PCS | Mod: S$PBB,,, | Performed by: PHYSICIAN ASSISTANT

## 2020-12-31 PROCEDURE — 99213 OFFICE O/P EST LOW 20 MIN: CPT | Mod: S$PBB,,, | Performed by: PHYSICIAN ASSISTANT

## 2020-12-31 PROCEDURE — 99999 PR PBB SHADOW E&M-EST. PATIENT-LVL IV: CPT | Mod: PBBFAC,,, | Performed by: PHYSICIAN ASSISTANT

## 2020-12-31 PROCEDURE — 99214 OFFICE O/P EST MOD 30 MIN: CPT | Mod: PBBFAC | Performed by: PHYSICIAN ASSISTANT

## 2020-12-31 PROCEDURE — 99999 PR PBB SHADOW E&M-EST. PATIENT-LVL IV: ICD-10-PCS | Mod: PBBFAC,,, | Performed by: PHYSICIAN ASSISTANT

## 2020-12-31 RX ORDER — DICLOFENAC SODIUM 10 MG/G
2 GEL TOPICAL 4 TIMES DAILY
Qty: 1 TUBE | Refills: 1 | Status: SHIPPED | OUTPATIENT
Start: 2020-12-31 | End: 2023-01-04

## 2021-01-08 ENCOUNTER — HOSPITAL ENCOUNTER (OUTPATIENT)
Dept: RADIOLOGY | Facility: HOSPITAL | Age: 61
Discharge: HOME | End: 2021-01-08
Attending: PHYSICIAN ASSISTANT
Payer: MEDICAID

## 2021-01-08 DIAGNOSIS — M25.519 SHOULDER PAIN, UNSPECIFIED CHRONICITY, UNSPECIFIED LATERALITY: ICD-10-CM

## 2021-01-08 PROCEDURE — 73221 MRI JOINT UPR EXTREM W/O DYE: CPT | Mod: TC,RT

## 2021-01-08 PROCEDURE — 73221 MRI JOINT UPR EXTREM W/O DYE: CPT | Mod: 26,RT,, | Performed by: RADIOLOGY

## 2021-01-08 PROCEDURE — 73221 MRI SHOULDER WITHOUT CONTRAST RIGHT: ICD-10-PCS | Mod: 26,RT,, | Performed by: RADIOLOGY

## 2021-01-15 ENCOUNTER — OFFICE VISIT (OUTPATIENT)
Dept: ORTHOPEDICS | Facility: CLINIC | Age: 61
End: 2021-01-15
Payer: MEDICAID

## 2021-01-15 VITALS
DIASTOLIC BLOOD PRESSURE: 82 MMHG | RESPIRATION RATE: 14 BRPM | BODY MASS INDEX: 23.94 KG/M2 | SYSTOLIC BLOOD PRESSURE: 122 MMHG | WEIGHT: 157.94 LBS | HEIGHT: 68 IN | TEMPERATURE: 98 F

## 2021-01-15 DIAGNOSIS — M25.511 CHRONIC RIGHT SHOULDER PAIN: Primary | ICD-10-CM

## 2021-01-15 DIAGNOSIS — S43.431D TEAR OF RIGHT GLENOID LABRUM, SUBSEQUENT ENCOUNTER: ICD-10-CM

## 2021-01-15 DIAGNOSIS — G89.29 CHRONIC RIGHT SHOULDER PAIN: Primary | ICD-10-CM

## 2021-01-15 DIAGNOSIS — M67.819 TENDINOSIS OF ROTATOR CUFF: ICD-10-CM

## 2021-01-15 PROCEDURE — 99213 PR OFFICE/OUTPT VISIT, EST, LEVL III, 20-29 MIN: ICD-10-PCS | Mod: S$PBB,,, | Performed by: PHYSICIAN ASSISTANT

## 2021-01-15 PROCEDURE — 99999 PR PBB SHADOW E&M-EST. PATIENT-LVL III: ICD-10-PCS | Mod: PBBFAC,,, | Performed by: PHYSICIAN ASSISTANT

## 2021-01-15 PROCEDURE — 99213 OFFICE O/P EST LOW 20 MIN: CPT | Mod: S$PBB,,, | Performed by: PHYSICIAN ASSISTANT

## 2021-01-15 PROCEDURE — 99999 PR PBB SHADOW E&M-EST. PATIENT-LVL III: CPT | Mod: PBBFAC,,, | Performed by: PHYSICIAN ASSISTANT

## 2021-01-15 PROCEDURE — 99213 OFFICE O/P EST LOW 20 MIN: CPT | Mod: PBBFAC | Performed by: PHYSICIAN ASSISTANT

## 2021-02-04 PROBLEM — K74.60 CIRRHOSIS: Status: ACTIVE | Noted: 2021-02-04

## 2021-05-06 ENCOUNTER — PATIENT MESSAGE (OUTPATIENT)
Dept: RESEARCH | Facility: HOSPITAL | Age: 61
End: 2021-05-06

## 2021-05-10 ENCOUNTER — PATIENT MESSAGE (OUTPATIENT)
Dept: RESEARCH | Facility: HOSPITAL | Age: 61
End: 2021-05-10

## 2021-06-01 ENCOUNTER — OFFICE VISIT (OUTPATIENT)
Dept: NEUROLOGY | Facility: CLINIC | Age: 61
End: 2021-06-01
Payer: MEDICAID

## 2021-06-01 VITALS
DIASTOLIC BLOOD PRESSURE: 90 MMHG | WEIGHT: 155.44 LBS | HEIGHT: 67 IN | BODY MASS INDEX: 24.4 KG/M2 | SYSTOLIC BLOOD PRESSURE: 132 MMHG | OXYGEN SATURATION: 96 % | HEART RATE: 67 BPM

## 2021-06-01 DIAGNOSIS — M54.42 CHRONIC BILATERAL LOW BACK PAIN WITH BILATERAL SCIATICA: ICD-10-CM

## 2021-06-01 DIAGNOSIS — G89.29 CHRONIC RIGHT SHOULDER PAIN: ICD-10-CM

## 2021-06-01 DIAGNOSIS — G89.29 CHRONIC NECK PAIN: Primary | ICD-10-CM

## 2021-06-01 DIAGNOSIS — G89.29 CHRONIC BILATERAL LOW BACK PAIN WITH BILATERAL SCIATICA: ICD-10-CM

## 2021-06-01 DIAGNOSIS — M25.511 CHRONIC RIGHT SHOULDER PAIN: ICD-10-CM

## 2021-06-01 DIAGNOSIS — M54.41 CHRONIC BILATERAL LOW BACK PAIN WITH BILATERAL SCIATICA: ICD-10-CM

## 2021-06-01 DIAGNOSIS — M54.2 CHRONIC NECK PAIN: Primary | ICD-10-CM

## 2021-06-01 PROCEDURE — 99213 OFFICE O/P EST LOW 20 MIN: CPT | Mod: PBBFAC | Performed by: PHYSICIAN ASSISTANT

## 2021-06-01 PROCEDURE — 99214 PR OFFICE/OUTPT VISIT, EST, LEVL IV, 30-39 MIN: ICD-10-PCS | Mod: S$PBB,,, | Performed by: PHYSICIAN ASSISTANT

## 2021-06-01 PROCEDURE — 99999 PR PBB SHADOW E&M-EST. PATIENT-LVL III: CPT | Mod: PBBFAC,,, | Performed by: PHYSICIAN ASSISTANT

## 2021-06-01 PROCEDURE — 99214 OFFICE O/P EST MOD 30 MIN: CPT | Mod: S$PBB,,, | Performed by: PHYSICIAN ASSISTANT

## 2021-06-01 PROCEDURE — 99999 PR PBB SHADOW E&M-EST. PATIENT-LVL III: ICD-10-PCS | Mod: PBBFAC,,, | Performed by: PHYSICIAN ASSISTANT

## 2021-06-29 ENCOUNTER — OFFICE VISIT (OUTPATIENT)
Dept: ORTHOPEDICS | Facility: CLINIC | Age: 61
End: 2021-06-29
Payer: MEDICAID

## 2021-06-29 VITALS
OXYGEN SATURATION: 100 % | HEART RATE: 69 BPM | BODY MASS INDEX: 24.62 KG/M2 | DIASTOLIC BLOOD PRESSURE: 80 MMHG | RESPIRATION RATE: 16 BRPM | WEIGHT: 156.88 LBS | HEIGHT: 67 IN | SYSTOLIC BLOOD PRESSURE: 132 MMHG

## 2021-06-29 DIAGNOSIS — M25.511 CHRONIC RIGHT SHOULDER PAIN: Primary | ICD-10-CM

## 2021-06-29 DIAGNOSIS — G89.29 CHRONIC RIGHT SHOULDER PAIN: Primary | ICD-10-CM

## 2021-06-29 PROCEDURE — 99213 OFFICE O/P EST LOW 20 MIN: CPT | Mod: PBBFAC | Performed by: PHYSICIAN ASSISTANT

## 2021-06-29 PROCEDURE — 99999 PR PBB SHADOW E&M-EST. PATIENT-LVL III: ICD-10-PCS | Mod: PBBFAC,,, | Performed by: PHYSICIAN ASSISTANT

## 2021-06-29 PROCEDURE — 99213 PR OFFICE/OUTPT VISIT, EST, LEVL III, 20-29 MIN: ICD-10-PCS | Mod: S$PBB,,, | Performed by: PHYSICIAN ASSISTANT

## 2021-06-29 PROCEDURE — 99213 OFFICE O/P EST LOW 20 MIN: CPT | Mod: S$PBB,,, | Performed by: PHYSICIAN ASSISTANT

## 2021-06-29 PROCEDURE — 99999 PR PBB SHADOW E&M-EST. PATIENT-LVL III: CPT | Mod: PBBFAC,,, | Performed by: PHYSICIAN ASSISTANT

## 2021-11-02 ENCOUNTER — OFFICE VISIT (OUTPATIENT)
Dept: NEUROLOGY | Facility: CLINIC | Age: 61
End: 2021-11-02
Payer: MEDICAID

## 2021-11-02 VITALS
HEART RATE: 79 BPM | WEIGHT: 154.13 LBS | DIASTOLIC BLOOD PRESSURE: 100 MMHG | BODY MASS INDEX: 23.36 KG/M2 | HEIGHT: 68 IN | SYSTOLIC BLOOD PRESSURE: 140 MMHG

## 2021-11-02 DIAGNOSIS — M54.2 CHRONIC NECK PAIN: ICD-10-CM

## 2021-11-02 DIAGNOSIS — G89.29 CHRONIC BILATERAL LOW BACK PAIN WITH SCIATICA, SCIATICA LATERALITY UNSPECIFIED: Primary | ICD-10-CM

## 2021-11-02 DIAGNOSIS — M25.511 CHRONIC RIGHT SHOULDER PAIN: ICD-10-CM

## 2021-11-02 DIAGNOSIS — M54.40 CHRONIC BILATERAL LOW BACK PAIN WITH SCIATICA, SCIATICA LATERALITY UNSPECIFIED: Primary | ICD-10-CM

## 2021-11-02 DIAGNOSIS — G89.29 CHRONIC RIGHT SHOULDER PAIN: ICD-10-CM

## 2021-11-02 DIAGNOSIS — G89.29 CHRONIC NECK PAIN: ICD-10-CM

## 2021-11-02 PROCEDURE — 99999 PR PBB SHADOW E&M-EST. PATIENT-LVL III: ICD-10-PCS | Mod: PBBFAC,,, | Performed by: PHYSICIAN ASSISTANT

## 2021-11-02 PROCEDURE — 99213 OFFICE O/P EST LOW 20 MIN: CPT | Mod: PBBFAC | Performed by: PHYSICIAN ASSISTANT

## 2021-11-02 PROCEDURE — 99214 OFFICE O/P EST MOD 30 MIN: CPT | Mod: S$PBB,,, | Performed by: PHYSICIAN ASSISTANT

## 2021-11-02 PROCEDURE — 99214 PR OFFICE/OUTPT VISIT, EST, LEVL IV, 30-39 MIN: ICD-10-PCS | Mod: S$PBB,,, | Performed by: PHYSICIAN ASSISTANT

## 2021-11-02 PROCEDURE — 99999 PR PBB SHADOW E&M-EST. PATIENT-LVL III: CPT | Mod: PBBFAC,,, | Performed by: PHYSICIAN ASSISTANT

## 2021-11-02 RX ORDER — PNV NO.95/FERROUS FUM/FOLIC AC 28MG-0.8MG
1000 TABLET ORAL DAILY
COMMUNITY

## 2021-11-02 RX ORDER — PENTOXIFYLLINE 400 MG/1
400 TABLET, EXTENDED RELEASE ORAL DAILY
COMMUNITY
End: 2022-04-20

## 2021-12-16 PROBLEM — M27.2 OSTEORADIONECROSIS OF JAW: Status: ACTIVE | Noted: 2021-12-16
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
## 2021-12-16 PROBLEM — Z01.89 ENCOUNTER FOR RESPIRATORY CLEARANCE EXAMINATION: Status: ACTIVE | Noted: 2021-12-16

## 2022-01-10 PROBLEM — R51.9 FRONTAL HEADACHE: Status: ACTIVE | Noted: 2022-01-10

## 2022-04-06 PROBLEM — M27.2: Status: RESOLVED | Noted: 2019-09-25 | Resolved: 2022-04-06

## 2022-08-10 PROBLEM — K85.90 PANCREATITIS: Status: ACTIVE | Noted: 2022-06-01

## 2023-01-04 ENCOUNTER — OFFICE VISIT (OUTPATIENT)
Dept: CARDIOLOGY | Facility: CLINIC | Age: 63
End: 2023-01-04
Payer: MEDICARE

## 2023-01-04 VITALS
HEART RATE: 82 BPM | WEIGHT: 156.06 LBS | DIASTOLIC BLOOD PRESSURE: 71 MMHG | BODY MASS INDEX: 23.65 KG/M2 | SYSTOLIC BLOOD PRESSURE: 124 MMHG | HEIGHT: 68 IN | OXYGEN SATURATION: 97 % | RESPIRATION RATE: 16 BRPM

## 2023-01-04 DIAGNOSIS — I45.10 RBBB: ICD-10-CM

## 2023-01-04 DIAGNOSIS — I10 PRIMARY HYPERTENSION: Primary | ICD-10-CM

## 2023-01-04 DIAGNOSIS — E78.00 PURE HYPERCHOLESTEROLEMIA: ICD-10-CM

## 2023-01-04 DIAGNOSIS — Z95.828 PORT-A-CATH IN PLACE: ICD-10-CM

## 2023-01-04 PROCEDURE — 99999 PR PBB SHADOW E&M-EST. PATIENT-LVL III: CPT | Mod: PBBFAC,,, | Performed by: INTERNAL MEDICINE

## 2023-01-04 PROCEDURE — 99999 PR STA SHADOW: CPT | Mod: PBBFAC,,, | Performed by: INTERNAL MEDICINE

## 2023-01-04 PROCEDURE — 99204 OFFICE O/P NEW MOD 45 MIN: CPT | Mod: S$PBB | Performed by: INTERNAL MEDICINE

## 2023-01-04 PROCEDURE — 99999 PR PBB SHADOW E&M-EST. PATIENT-LVL III: ICD-10-PCS | Mod: PBBFAC,,, | Performed by: INTERNAL MEDICINE

## 2023-01-04 PROCEDURE — 99213 OFFICE O/P EST LOW 20 MIN: CPT | Mod: PBBFAC | Performed by: INTERNAL MEDICINE

## 2023-01-04 NOTE — PROGRESS NOTES
[facility] Cardiology     Subjective:    Patient ID:  Junior Torito Serna Jr. is a 62 y.o. male who presents for evaluation of Hyperlipidemia (Referred by Lisa Wilson NP) and Hypertension    Review of patient's allergies indicates:  No Known Allergies   He is referred for cholesterol issues.  His LDL is 146, HDL 38, triglycerides 131 mg% with total cholesterol 210 mg%.  He did not know his cholesterol was high.  He has a history of hypertension.  His blood pressures are controlled.  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  He has history of hepatitis C with previous therapy.  That condition is also in remission.    He retired from the oil business.  He has hypertension.  He does not have shortness of breath or chest pain.  His Electrocardiogram shows right bundle branch block.  In 2017 his Electrocardiogram did not show right bundle branch block.  He is not a cigarette smoker.      Review of Systems   Constitutional: Negative for chills, decreased appetite, diaphoresis, fever, malaise/fatigue, night sweats, weight gain and weight loss.   HENT:  Negative for congestion, ear discharge, ear pain, hearing loss, hoarse voice, nosebleeds, odynophagia, sore throat, stridor and tinnitus.    Eyes:  Negative for blurred vision, discharge, double vision, pain, photophobia, redness, vision loss in left eye, vision loss in right eye, visual disturbance and visual halos.   Cardiovascular:  Negative for chest pain, claudication, cyanosis, dyspnea on exertion, irregular heartbeat, leg swelling, near-syncope, orthopnea, palpitations, paroxysmal nocturnal dyspnea and syncope.   Respiratory:  Negative for cough, hemoptysis, shortness of breath, sleep disturbances due to breathing, snoring, sputum production and wheezing.    Endocrine: Negative for cold intolerance, heat intolerance, polydipsia, polyphagia and polyuria.   Hematologic/Lymphatic: Negative for  "adenopathy and bleeding problem. Does not bruise/bleed easily.   Skin:  Negative for color change, dry skin, flushing, itching, nail changes, poor wound healing, rash, skin cancer, suspicious lesions and unusual hair distribution.   Musculoskeletal:  Negative for arthritis, back pain, falls, gout, joint pain, joint swelling, muscle cramps, muscle weakness, myalgias, neck pain and stiffness.   Gastrointestinal:  Negative for bloating, abdominal pain, anorexia, change in bowel habit, bowel incontinence, constipation, diarrhea, dysphagia, excessive appetite, flatus, heartburn, hematemesis, hematochezia, hemorrhoids, jaundice, melena, nausea and vomiting.   Genitourinary:  Negative for bladder incontinence, decreased libido, dysuria, flank pain, frequency, genital sores, hematuria, hesitancy, incomplete emptying, nocturia and urgency.   Neurological:  Negative for aphonia, brief paralysis, difficulty with concentration, disturbances in coordination, excessive daytime sleepiness, dizziness, focal weakness, headaches, light-headedness, loss of balance, numbness, paresthesias, seizures, sensory change, tremors, vertigo and weakness.   Psychiatric/Behavioral:  Negative for altered mental status, depression, hallucinations, memory loss, substance abuse, suicidal ideas and thoughts of violence. The patient does not have insomnia and is not nervous/anxious.    Allergic/Immunologic: Negative for hives and persistent infections.      Objective:       Vitals:    01/04/23 1408   BP: 124/71   BP Location: Left arm   Patient Position: Sitting   BP Method: Medium (Automatic)   Pulse: 82   Resp: 16   SpO2: 97%   Weight: 70.8 kg (156 lb 1.4 oz)   Height: 5' 8" (1.727 m)    Physical Exam  Constitutional:       General: He is not in acute distress.     Appearance: He is well-developed. He is not diaphoretic.   HENT:      Head: Normocephalic and atraumatic.      Nose: Nose normal.   Eyes:      General: No scleral icterus.        Right " eye: No discharge.      Conjunctiva/sclera: Conjunctivae normal.      Pupils: Pupils are equal, round, and reactive to light.   Neck:      Thyroid: No thyromegaly.      Vascular: No JVD.      Trachea: No tracheal deviation.   Cardiovascular:      Rate and Rhythm: Normal rate and regular rhythm.      Pulses:           Carotid pulses are 2+ on the right side and 2+ on the left side.       Radial pulses are 2+ on the right side and 2+ on the left side.        Dorsalis pedis pulses are 2+ on the right side and 2+ on the left side.        Posterior tibial pulses are 2+ on the right side and 2+ on the left side.      Heart sounds: Normal heart sounds. No murmur heard.    No friction rub. No gallop.   Pulmonary:      Effort: Pulmonary effort is normal. No respiratory distress.      Breath sounds: Normal breath sounds. No stridor. No wheezing or rales.   Chest:      Chest wall: No tenderness.   Abdominal:      General: Bowel sounds are normal. There is no distension.      Palpations: Abdomen is soft. There is no mass.      Tenderness: There is no abdominal tenderness. There is no guarding or rebound.   Musculoskeletal:         General: No tenderness. Normal range of motion.      Cervical back: Normal range of motion and neck supple.   Lymphadenopathy:      Cervical: No cervical adenopathy.   Skin:     General: Skin is warm and dry.      Coloration: Skin is not pale.      Findings: No erythema or rash.   Neurological:      Mental Status: He is alert and oriented to person, place, and time.      Cranial Nerves: No cranial nerve deficit.      Coordination: Coordination normal.   Psychiatric:         Behavior: Behavior normal.         Thought Content: Thought content normal.         Judgment: Judgment normal.         Assessment:       1. Primary hypertension    2. Portacath in place    3. Pure hypercholesterolemia    4. RBBB      Results for orders placed or performed in visit on 12/30/22   Urine culture    Specimen: Urine,  Clean Catch   Result Value Ref Range    Urine Culture, Routine No growth    Urinalysis   Result Value Ref Range    Specimen UA Urine, Clean Catch     Color, UA Yellow Yellow, Straw, Myriam    Appearance, UA Clear Clear    pH, UA 8.0 5.0 - 8.0    Specific Gravity, UA 1.005 1.005 - 1.030    Protein, UA Negative Negative    Glucose, UA Negative Negative    Ketones, UA Negative Negative    Bilirubin (UA) Negative Negative    Occult Blood UA 1+ (A) Negative    Nitrite, UA Negative Negative    Leukocytes, UA Negative Negative   Urinalysis Microscopic   Result Value Ref Range    RBC, UA 1 0 - 4 /hpf    WBC, UA 0 0 - 5 /hpf    Microscopic Comment SEE COMMENT    POCT urine dipstick without microscope   Result Value Ref Range    Color, UA Yellow     pH, UA 7.0     WBC, UA neg     Nitrite, UA neg     Protein, POC neg     Glucose, UA neg     Ketones, UA neg     Urobilinogen, UA 0.2     Bilirubin, POC neg     Blood, UA neg     Clarity, UA Clear     Spec Grav UA 1.015    Cytology, Urine   Result Value Ref Range    Final Pathologic Diagnosis (A)      Unsatisfactory.  Sparsely cellular specimen.  Rare benign urothelial and epithelial cells identified.      Microscopic Exam (A)      1 Thin Prep Cytology Slide  All positive and negative controls stain adequately.      Disclaimer (A)      Screening was performed at [facility], [address], [place] [address].           Current Outpatient Medications:     acetaminophen (TYLENOL) 500 MG tablet, Take 500 mg by mouth every 6 (six) hours as needed for Pain., Disp: , Rfl:     lidocaine (LIDODERM) 5 %, Place 2 patches onto the skin once daily. One patch to neck and one to low back daily., Disp: 90 patch, Rfl: 3    quinapriL (ACCUPRIL) 5 MG tablet, Take 5 mg by mouth Daily., Disp: , Rfl:     sildenafiL (VIAGRA) 100 MG tablet, TAKE 1 TABLET (100 MG TOTAL) BY MOUTH DAILY AS NEEDED FOR ERECTILE DYSFUNCTION., Disp: 15 tablet, Rfl: 5    vitamin  E 1000 UNIT capsule, Take 1,000 Units by mouth once daily., Disp: , Rfl:      Lab Results   Component Value Date    WBC 6.66 08/10/2022    RBC 4.52 (L) 08/10/2022    HGB 13.6 (L) 08/10/2022    HCT 40.8 08/10/2022    MCV 90 08/10/2022    MCH 30.1 08/10/2022    MCHC 33.3 08/10/2022    RDW 13.1 08/10/2022     (L) 08/10/2022    MPV 9.9 08/10/2022    GRAN 4.2 08/10/2022    GRAN 63.4 08/10/2022    LYMPH 1.8 08/10/2022    LYMPH 26.3 08/10/2022    MONO 0.6 08/10/2022    MONO 8.4 08/10/2022    EOS 0.1 08/10/2022    BASO 0.01 08/10/2022    EOSINOPHIL 1.5 08/10/2022    BASOPHIL 0.2 08/10/2022        CMP  Lab Results   Component Value Date     08/10/2022    K 4.0 08/10/2022     08/10/2022    CO2 26 08/10/2022    GLU 95 08/10/2022    BUN 15 08/10/2022    CREATININE 1.2 08/10/2022    CALCIUM 8.9 08/10/2022    PROT 6.7 08/10/2022    ALBUMIN 3.9 08/10/2022    BILITOT 0.7 08/10/2022    ALKPHOS 65 08/10/2022    AST 22 08/10/2022    ALT 12 08/10/2022    ANIONGAP 10 08/10/2022    ESTGFRAFRICA >60 04/20/2022    EGFRNONAA >60 04/20/2022        Lab Results   Component Value Date    LABURIN No growth 12/30/2022            Results for orders placed or performed during the hospital encounter of 05/03/17   EKG 12-lead    Collection Time: 05/03/17 12:26 PM    Narrative    Test Reason : R55  Vent. Rate : 090 BPM     Atrial Rate : 090 BPM     P-R Int : 144 ms          QRS Dur : 100 ms      QT Int : 358 ms       P-R-T Axes : 067 165 021 degrees     QTc Int : 437 ms    Normal sinus rhythm  Left posterior fascicular block  Abnormal ECG  No previous ECGs available  Confirmed by Aniceto Gonzalez MD ([id number]) on 5/3/2017 3:29:01 PM    Referred By: NO PCP           Confirmed By:Aniceto Gonzalez MD                  Plan:       Problem List Items Addressed This Visit          Cardiac/Vascular    Portacath in place    Hypertension - Primary     He is on Accupril 5 mg.  Blood pressures are controlled.  No changes advised.         Pure  hypercholesterolemia     His LDL is 146.  His 10 year myocardial infarction rate is 14% by risk calculator.  Treatment indicated, we are checking with gastroenterology for clearance.         RBBB     Last Electrocardiogram without right bundle branch block 2017.  There is no infarct pattern or concerning ST changes.  Condition stable.                   The patient's 10 year heart attack risk score is 14%.  Obviously statin therapy indicated.  I will investigate usage of statin therapy in patients with hep C remission status.  Likely GI will approve usage.  I spoke to Lisa Wilson [facility] who will also reach out to GI.  For now no follow-up needed.    Thank you for allowing me to participate in your patient's care.           Mitchell Sanchez MD  01/05/2023   2:49 PM    Addendum:  I spoke to Neena Liz with Gastroenterology who felt he is safe to initiate statin therapy.  I recommend 20 mg atorvastatin.  I will phone it into his pharmacy.  He will be notified.

## 2023-01-05 ENCOUNTER — PATIENT MESSAGE (OUTPATIENT)
Dept: CARDIOLOGY | Facility: CLINIC | Age: 63
End: 2023-01-05
Payer: MEDICARE

## 2023-01-05 DIAGNOSIS — I10 PRIMARY HYPERTENSION: Primary | ICD-10-CM

## 2023-01-05 PROBLEM — I45.10 RBBB: Status: ACTIVE | Noted: 2023-01-05

## 2023-01-05 PROBLEM — E78.00 PURE HYPERCHOLESTEROLEMIA: Status: ACTIVE | Noted: 2023-01-05

## 2023-01-05 NOTE — ASSESSMENT & PLAN NOTE
Last Electrocardiogram without right bundle branch block 2017.  There is no infarct pattern or concerning ST changes.  Condition stable.

## 2023-01-05 NOTE — ASSESSMENT & PLAN NOTE
His LDL is 146.  His 10 year myocardial infarction rate is 14% by risk calculator.  Treatment indicated, we are checking with gastroenterology for clearance.